# Patient Record
Sex: FEMALE | Race: WHITE | NOT HISPANIC OR LATINO | Employment: FULL TIME | URBAN - METROPOLITAN AREA
[De-identification: names, ages, dates, MRNs, and addresses within clinical notes are randomized per-mention and may not be internally consistent; named-entity substitution may affect disease eponyms.]

---

## 2024-01-14 ENCOUNTER — OFFICE VISIT (OUTPATIENT)
Dept: FAMILY MEDICINE | Facility: CLINIC | Age: 59
End: 2024-01-14
Payer: COMMERCIAL

## 2024-01-14 VITALS
HEART RATE: 58 BPM | RESPIRATION RATE: 16 BRPM | TEMPERATURE: 97.9 F | SYSTOLIC BLOOD PRESSURE: 118 MMHG | DIASTOLIC BLOOD PRESSURE: 80 MMHG | OXYGEN SATURATION: 98 %

## 2024-01-14 DIAGNOSIS — R05.1 ACUTE COUGH: Primary | ICD-10-CM

## 2024-01-14 DIAGNOSIS — J02.9 SORE THROAT: ICD-10-CM

## 2024-01-14 DIAGNOSIS — J06.9 UPPER RESPIRATORY TRACT INFECTION, UNSPECIFIED TYPE: ICD-10-CM

## 2024-01-14 LAB — DEPRECATED S PYO AG THROAT QL EIA: NEGATIVE

## 2024-01-14 PROCEDURE — 99203 OFFICE O/P NEW LOW 30 MIN: CPT

## 2024-01-14 PROCEDURE — 87651 STREP A DNA AMP PROBE: CPT

## 2024-01-14 PROCEDURE — 87635 SARS-COV-2 COVID-19 AMP PRB: CPT

## 2024-01-14 ASSESSMENT — ENCOUNTER SYMPTOMS
COUGH: 1
WHEEZING: 0
SHORTNESS OF BREATH: 0
SINUS PRESSURE: 0
CHILLS: 0
RHINORRHEA: 0
SINUS PAIN: 0
CHEST TIGHTNESS: 0
FEVER: 0

## 2024-01-14 NOTE — PROGRESS NOTES
Assessment & Plan     Acute cough    -Patient is requesting calculation of the cycle threshold value with the COVID-19 PCR test.  Patient had COVID-19 infection about 1.5 months ago.  She is requesting this value because  is a lung transplant patient. Request was sent to the [facility] lab over the phone.   - Symptomatic COVID-19 Virus (Coronavirus) by PCR Nose  - Symptomatic COVID-19 Virus (Coronavirus) by PCR    Sore throat    -Strep (-)  - Streptococcus A Rapid Scr w Reflx to PCR  - Group A Streptococcus PCR Throat Swab    Upper respiratory tract infection, unspecified type    - Supportive care recommended (rest, adequate fluid intake and analgesics such as acetaminophen and ibuprofen)       Results for orders placed or performed in visit on 01/14/24   Streptococcus A Rapid Scr w Reflx to PCR     Status: Normal    Specimen: Throat; Swab   Result Value Ref Range    Group A Strep antigen Negative Negative       Patient Instructions   Viral cough  Lungs clear to auscultation bilaterally  Strep is negative  Supportive and symptomatic care recommended  Gargle water salt for the throat pain  Honey helps with the cough  You can try elderberry extract    Return if symptoms worsen or fail to improve, for Follow up.    At the end of the encounter, I discussed results, diagnosis, medications. Discussed red flags for immediate return to clinic/ER, as well as indications for follow up if no improvement. Patient understood and agreed to plan. Patient was stable for discharge.    Marcus Ramos is a 58 year old female who presents to clinic today for the following health issues:  Chief Complaint   Patient presents with    Urgent Care     Cough, sore throat x a week -  is transplant pt      HPI    Patient reports cough, sore throat for 1 week.  Patient's  is a lung transplant patient.  Patient reports COVID-19 infection about 1.5 months ago.  Patient is requesting completion of this cycle threshold value  since  is immunocompromised.  She denies fever and chills.    Review of Systems   Constitutional:  Negative for chills and fever.   HENT:  Negative for congestion, rhinorrhea, sinus pressure and sinus pain.    Respiratory:  Positive for cough. Negative for chest tightness, shortness of breath and wheezing.        Problem List:  There are no relevant problems documented for this patient.      No past medical history on file.    Social History     Tobacco Use    Smoking status: Never    Smokeless tobacco: Never   Substance Use Topics    Alcohol use: Yes           Objective    /80   Pulse 58   Temp 97.9  F (36.6  C) (Tympanic)   Resp 16   LMP  (LMP Unknown)   SpO2 98%   Physical Exam  Constitutional:       Appearance: Normal appearance.   HENT:      Head: Normocephalic.      Mouth/Throat:      Mouth: Mucous membranes are moist.      Pharynx: Oropharynx is clear. Uvula midline. No posterior oropharyngeal erythema.   Cardiovascular:      Rate and Rhythm: Normal rate and regular rhythm.   Pulmonary:      Effort: Pulmonary effort is normal.      Breath sounds: Normal breath sounds.   Lymphadenopathy:      Head:      Right side of head: No submental, submandibular or tonsillar adenopathy.      Left side of head: No submental, submandibular or tonsillar adenopathy.      Cervical: No cervical adenopathy.      Right cervical: No superficial cervical adenopathy.     Left cervical: No superficial cervical adenopathy.   Skin:     General: Skin is warm and dry.      Findings: No rash.   Neurological:      Mental Status: She is alert.   Psychiatric:         Mood and Affect: Mood normal.         Behavior: Behavior normal.              Juliet Coto PA-C

## 2024-01-14 NOTE — PATIENT INSTRUCTIONS
Viral cough  Lungs clear to auscultation bilaterally  Strep is negative  Supportive and symptomatic care recommended  Gargle water salt for the throat pain  Honey helps with the cough  You can try elderberry extract

## 2024-01-15 LAB
CYCLE THRESHOLD (CT): 0
GROUP A STREP BY PCR: NOT DETECTED
SARS-COV-2 RNA RESP QL NAA+PROBE: NEGATIVE

## 2024-03-02 ENCOUNTER — HEALTH MAINTENANCE LETTER (OUTPATIENT)
Age: 59
End: 2024-03-02

## 2024-12-07 ENCOUNTER — HEALTH MAINTENANCE LETTER (OUTPATIENT)
Age: 59
End: 2024-12-07

## 2025-03-15 ENCOUNTER — HEALTH MAINTENANCE LETTER (OUTPATIENT)
Age: 60
End: 2025-03-15